# Patient Record
Sex: FEMALE | URBAN - METROPOLITAN AREA
[De-identification: names, ages, dates, MRNs, and addresses within clinical notes are randomized per-mention and may not be internally consistent; named-entity substitution may affect disease eponyms.]

---
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
## 2022-12-21 NOTE — PROGRESS NOTES
Case management 8/23  Checked in with the [facility] to see what the referral process would be. They have a short waitlist. There is a referral form on their website that would need to be completed and when clinicals are sent in they will contact the family to set up an assessment. Parents have to fill out a form on from their website also. This was a general call as there was no exchange of pt confidential information.    Researching other options such as [facility] have a medium IOP in [facility]. Joceline would make the referral and they would be in contact with the family after discharge.    Talked with Jayda livingston Saragosa to see if they have had any clients from the [facility] and she reported that they have.    LM for lance Colón requesting collaterals and a return call   Cyclosporine Counseling:  I discussed with the patient the risks of cyclosporine including but not limited to hypertension, gingival hyperplasia,myelosuppression, immunosuppression, liver damage, kidney damage, neurotoxicity, lymphoma, and serious infections. The patient understands that monitoring is required including baseline blood pressure, CBC, CMP, lipid panel and uric acid, and then 1-2 times monthly CMP and blood pressure.

## 2023-01-16 NOTE — PROGRESS NOTES
08/22/18 1400   Psycho Education   Treatment Detail (Dual Group, see note)     Pt presented her drug chart in group today. Please refer to paper drug chart in chart for details. Was able to see the progression to increased use and continued consequences. After completing treatment had some relapses; however, did not return to continued use. Underlying shame and disappointment in herself. Drug chart accepted and placed in paper chart, copy given to pt per her request.    Ambulance

## 2024-05-09 NOTE — PROGRESS NOTES
"Referral meeting/ family session    Family Present:     Odalys(mother)  Deion(father)  Patient herself.      Presenting Problem/[place]     Discussed results of Comprehensive Assessment Program (CAP), especially with regard to psychological testing and Chemical Use Assessment/ Rule 25. Shared diagnoses regarding both areas of concern. Referred family to medical records for full reports. Parents were glad about changes made to medication, addressing weight gain and overall energy.     Discussed pt's progress and overall level of cooperation on the unit as well as continuing clinical concerns. Pt had done very well on the unit, demonstrating consistent motivation for change and continuous sobriety, showed good insight and leadership. Parents seemed pleased to hear this feedback.      Explained rationale for level of service recommendation given results of overall evaluation. Team is recommending Dual IOP, parents are choosing [facility] as location. Team will defer to their program's home contract. .     Discussed home engagement expectations for next level of care as an example of initial limits to similar programs/ level of service in the [facility] system. Discussed safety plan in detail with parents and pt. Facilitate interim expectations for home assisting parent and pt in negotiating expectations.       Therapist's Assessment    The patient was cooperative with the discharge meeting process, she is pleased as well about how she had done overall on 7A (MICD) She knows her greatest obstacles are boredom and choosing unhealthy friendships. She is willing to have parents assist her in better decision making related to peers. Parents also challenged her on \"social media drama\" being unhelpful and potentially destructive to pt's forward movement. She readily acknowledged that she \"cannot always tell, when that line is being crossed\" and a point is reached that it affects her negatively. She will allow parents to " "assist in that.     She trusts parents' investment and support of her. When this was discussed with all present, she was able to identify more specifics for family support. She wants to challenge herself and her father to \"be more open with our emotions.\" She also wanted some individual attention from parents, which do not include her younger siblings. Parents would like her to be more open with them about experiencing cravings to use,as they want to be part of helping her work through these moments without acting out.     Home expectations were successfully negotiated, related to contact with friends, use of phone and limited social media. Pt was able to advocate for herself, is open to listening to parents' concerns and ultimately is giving them control. Both parents and pt felt comfortable with their plan.         Recommendations and Plan  (Incuding problems not addressed in this hospitalization)    See discharge form  " Traveling alone